# Patient Record
Sex: FEMALE | Race: OTHER | HISPANIC OR LATINO | URBAN - METROPOLITAN AREA
[De-identification: names, ages, dates, MRNs, and addresses within clinical notes are randomized per-mention and may not be internally consistent; named-entity substitution may affect disease eponyms.]

---

## 2023-12-09 ENCOUNTER — EMERGENCY (EMERGENCY)
Facility: HOSPITAL | Age: 83
LOS: 1 days | Discharge: HOME | End: 2023-12-09
Attending: EMERGENCY MEDICINE
Payer: MEDICAID

## 2023-12-09 VITALS
OXYGEN SATURATION: 98 % | TEMPERATURE: 98 F | SYSTOLIC BLOOD PRESSURE: 122 MMHG | HEART RATE: 63 BPM | DIASTOLIC BLOOD PRESSURE: 76 MMHG | RESPIRATION RATE: 17 BRPM

## 2023-12-09 VITALS
TEMPERATURE: 98 F | HEIGHT: 62 IN | DIASTOLIC BLOOD PRESSURE: 80 MMHG | RESPIRATION RATE: 18 BRPM | SYSTOLIC BLOOD PRESSURE: 138 MMHG | OXYGEN SATURATION: 98 % | HEART RATE: 74 BPM

## 2023-12-09 PROCEDURE — 73521 X-RAY EXAM HIPS BI 2 VIEWS: CPT | Mod: 26

## 2023-12-09 PROCEDURE — 73521 X-RAY EXAM HIPS BI 2 VIEWS: CPT

## 2023-12-09 PROCEDURE — 70486 CT MAXILLOFACIAL W/O DYE: CPT | Mod: MA

## 2023-12-09 PROCEDURE — 70486 CT MAXILLOFACIAL W/O DYE: CPT | Mod: 26,MA

## 2023-12-09 PROCEDURE — 73562 X-RAY EXAM OF KNEE 3: CPT | Mod: 26,LT,RT

## 2023-12-09 PROCEDURE — 70450 CT HEAD/BRAIN W/O DYE: CPT | Mod: MA

## 2023-12-09 PROCEDURE — 99284 EMERGENCY DEPT VISIT MOD MDM: CPT | Mod: 25

## 2023-12-09 PROCEDURE — 99285 EMERGENCY DEPT VISIT HI MDM: CPT

## 2023-12-09 PROCEDURE — 73562 X-RAY EXAM OF KNEE 3: CPT

## 2023-12-09 PROCEDURE — 70450 CT HEAD/BRAIN W/O DYE: CPT | Mod: 26,MA

## 2023-12-09 RX ORDER — ACETAMINOPHEN 500 MG
650 TABLET ORAL ONCE
Refills: 0 | Status: COMPLETED | OUTPATIENT
Start: 2023-12-09 | End: 2023-12-09

## 2023-12-09 RX ORDER — IBUPROFEN 200 MG
400 TABLET ORAL ONCE
Refills: 0 | Status: DISCONTINUED | OUTPATIENT
Start: 2023-12-09 | End: 2023-12-12

## 2023-12-09 RX ADMIN — Medication 650 MILLIGRAM(S): at 13:30

## 2023-12-09 NOTE — ED PROVIDER NOTE - NSFOLLOWUPCLINICS_GEN_ALL_ED_FT
[facility]  Internal Medicine  [address] [place] [address]  Phone: [phone]  Fax: [phone]  Follow Up Time: 1-3 Days     [facility]  Internal Medicine  [address] [place] [address]  Phone: [phone]  Fax: [phone]  Follow Up Time: 1-3 Days

## 2023-12-09 NOTE — ED PROVIDER NOTE - PATIENT PORTAL LINK FT
You can access the FollowMyHealth Patient Portal offered by [facility] by registering at the following website: [email or web address]. By joining [employer]’s FollowMyHealth portal, you will also be able to view your health information using other applications (apps) compatible with our system. You can access the FollowMyHealth Patient Portal offered by [facility] by registering at the following website: [email or web address]. By joining [employer]’s FollowMyHealth portal, you will also be able to view your health information using other applications (apps) compatible with our system.

## 2023-12-09 NOTE — ED PROVIDER NOTE - OBJECTIVE STATEMENT
83-year-old woman, history of hypothyroidism, hypertension, hyperlipidemia, brought in by her son after she had a witnessed fall yesterday, witnessed by her sister, fell from a standing position while walking on a flat surface, with injury to her head and face, bilateral knees, and bilateral hips.  She denies any loss of consciousness.  She is not on any anticoagulation.  She did not have any symptoms preceding the fall.  She has been able to walk since then.

## 2023-12-09 NOTE — ED PROVIDER NOTE - NSFOLLOWUPINSTRUCTIONS_ED_ALL_ED_FT
Please follow up with your PMD or Medicine Clinic in 2-3 days.  Return to the ER for worsening or concerning symptoms.  Apply ice to swollen joints or areas, rest extremity and keep elevated.  Take Ibuprofen (Advil or Motrin) 400mg every 6 hours as needed for pain or fever with food.  Take Acetaminophen (Tylenol) 650mg every 6 hours as needed for pain or fever.    - - - - - - - - - - - - -  Head Injury, Adult    There are many types of head injuries. They can be as minor as a small bump. Some head injuries can be worse. Worse injuries include:  A strong hit to the head that shakes the brain back and forth, causing damage (concussion).  A bruise (contusion) of the brain. This means there is bleeding in the brain that can cause swelling.  A cracked skull (skull fracture).  Bleeding in the brain that gathers, gets thick (makes a clot), and forms a bump (hematoma).  Most problems from a head injury come in the first 24 hours. However, you may still have side effects up to 7–10 days after your injury. It is important to watch your condition for any changes. You may need to be watched in the emergency department or urgent care, or you may need to stay in the hospital.    What are the causes?  There are many possible causes of a head injury. A serious head injury may be caused by:  A car accident.  Bicycle or motorcycle accidents.  Sports injuries.  Falls.  Being hit by an object.  What are the signs or symptoms?  Symptoms of a head injury include a bruise, bump, or bleeding where the injury happened. Other physical symptoms may include:  Headache.  Feeling like you may vomit (nauseous) or vomiting.  Dizziness.  Blurred or double vision.  Being uncomfortable around bright lights or loud noises.  Shaking movements that you cannot control (seizures).  Feeling tired.  Trouble being woken up.  Fainting or loss of consciousness.  Mental or emotional symptoms may include:  Feeling grumpy or cranky.  Confusion and memory problems.  Having trouble paying attention or concentrating.  Changes in eating or sleeping habits.  Feeling worried or nervous (anxious).  Feeling sad (depressed).  How is this treated?  Treatment for this condition depends on how severe the injury is and the type of injury you have. The main goal is to prevent problems and to allow the brain time to heal.    Mild head injury    If you have a mild head injury, you may be sent home, and treatment may include:  Being watched. A responsible adult should stay with you for 24 hours after your injury and check on you often.  Physical rest.  Brain rest.  Pain medicines.  Severe head injury    If you have a severe head injury, treatment may include:  Being watched closely. This includes staying in the hospital.  Medicines to:  Help with pain.  Prevent seizures.  Help with brain swelling.  Protecting your airway and using a machine that helps you breathe (ventilator).  Treatments to watch for and manage swelling inside the brain.  Brain surgery. This may be needed to:  Remove a collection of blood or blood clots.  Stop the bleeding.  Remove a part of the skull. This allows room for the brain to swell.  Follow these instructions at home:  Activity    Rest.  Avoid activities that are hard or tiring.  Make sure you get enough sleep.  Let your brain rest. Do this by limiting activities that need a lot of thought or attention, such as:  Watching TV.  Playing memory games and puzzles.  Job-related work or homework.  Working on the computer, social media, and texting.  Avoid activities that could cause another head injury until your doctor says it is okay. This includes playing sports. Having another head injury, especially before the first one has healed, can be dangerous.  Ask your doctor when it is safe for you to go back to your normal activities, such as work or school. Ask your doctor for a step-by-step plan for slowly going back to your normal activities.  Ask your doctor when you can drive, ride a bicycle, or use heavy machinery. Do not do these activities if you are dizzy.  Lifestyle      Do not drink alcohol until your doctor says it is okay.  Do not use drugs.  If it is harder than usual to remember things, write them down.  If you are easily distracted, try to do one thing at a time.  Talk with family members or close friends when making important decisions.  Tell your friends, family, a trusted co-worker, and  about your injury, symptoms, and limits (restrictions). Have them watch for any problems that are new or getting worse.  General instructions    Take over-the-counter and prescription medicines only as told by your doctor.  Have someone stay with you for 24 hours after your head injury. This person should watch you for any changes in your symptoms and be ready to get help.  Keep all follow-up visits as told by your doctor. This is important.  How is this prevented?    Work on your balance and strength. This can help you avoid falls.  Wear a seat belt when you are in a moving vehicle.  Wear a helmet when you:  Ride a bicycle.  Ski.  Do any other sport or activity that has a risk of injury.  If you drink alcohol:  Limit how much you use to:  0–1 drink a day for nonpregnant women.  0–2 drinks a day for men.  Be aware of how much alcohol is in your drink. In the [place] one drink equals one 12 oz bottle of beer (355 mL), one 5 oz glass of wine (148 mL), or one 1½ oz glass of hard liquor (44 mL).  Make your home safer by:  Getting rid of clutter from the floors and stairs. This includes things that can make you trip.  Using grab bars in bathrooms and handrails by stairs.  Placing non-slip mats on floors and in bathtubs.  Putting more light in dim areas.  Where to find more information  Centers for Disease Control and Prevention: www.cdc.gov  Get help right away if:  You have:  A very bad headache that is not helped by medicine.  Trouble walking or weakness in your arms and legs.  Clear or bloody fluid coming from your nose or ears.  Changes in how you see (vision).  A seizure.  More confusion or more grumpy moods.  Your symptoms get worse.  You are sleepier than normal and have trouble staying awake.  You lose your balance.  The black centers of your eyes (pupils) change in size.  Your speech is slurred.  Your dizziness gets worse.  You vomit.  These symptoms may be an emergency. Do not wait to see if the symptoms will go away. Get medical help right away. Call your local emergency services (911 in the [place] Do not drive yourself to the hospital.    Summary  Head injuries can be as minor as a small bump. Some head injuries can be worse.  Treatment for this condition depends on how severe the injury is and the type of injury you have.  Have someone stay with you for 24 hours after your head injury.  Ask your doctor when it is safe for you to go back to your normal activities, such as work or school.  To prevent a head injury, wear a seat belt in a car, wear a helmet when you use a bicycle, limit your alcohol use, and make your home safer.  This information is not intended to replace advice given to you by your health care provider. Make sure you discuss any questions you have with your health care provider.  - - - - - - - - - - - - -  Lesión en la deidre en los adultos  Head Injury, Adult    Hay muchos tipos de lesiones en la deidre. Algunas pueden ser tan leves emanuel un chichón pequeño. Otras pueden ser más graves. Ejemplos de lesiones graves:  Un golpe ella en la deidre que sacude el cerebro hacia bakari y atrás (conmoción cerebral).  Un moretón (contusión) en el cerebro. Orwin significa que hay hemorragia en el cerebro que puede causar hinchazón.  Fisura en el cráneo (fractura de cráneo).  Hemorragia en el cerebro que se acumula, se espesa (se produce un coágulo) y forma un bulto (hematoma).  La mayoría de los problemas provocados por denisa lesión en la deidre ocurren nicole las primeras 24 horas. Sin embargo, es posible que siga teniendo efectos secundarios entre 7 y 10 días después de la lesión. Es importante controlar cohen afección para leonor si hay cambios. Es posible que deban observarlo en el departamento de emergencias o en el servicio de atención urgente, o puede ser necesario que se quede en el hospital.    ¿Cuáles son las causas?  Hay muchas causas posibles de denisa lesión en la deidre. Denisa lesión en la deidre puede tener estas causas:  Un accidente automovilístico.  Accidentes en bicicleta o motocicleta.  Lesiones deportivas.  Caídas.  Ser golpeado por un objeto.  ¿Cuáles son los signos o síntomas?  Los síntomas de lesión en la deidre incluyen un moretón, un chichón o un sangrado en el lugar de la lesión. Otros síntomas físicos pueden ser:  Dolor de deidre.  Sensación de que va a vomitar (náuseas) o vomitar.  Mareos.  Visión borrosa o doble.  Sentir incomodidad cerca de luces brillantes o ruidos rajesh.  Temblores que no puede controlar (convulsiones).  Cansancio.  Dificultad para despertarse.  Desmayos o pérdida de la conciencia.  Los síntomas mentales o emocionales pueden incluir los siguientes:  Sentirse abrumado o malhumorado.  Confusión y problemas de memoria.  Tener problemas para prestar atención o concentrarse.  Cambios en los hábitos de alimentación o en el sueño.  Sentirse preocupado o nervioso (ansioso).  Sentirse courtney (deprimido).  ¿Cómo se trata?  El tratamiento de esta afección depende de la gravedad de la lesión y del tipo de lesión que sufrió. El objetivo principal es prevenir problemas y darle tiempo al cerebro para que se recupere.    Lesión de deidre leve    Si usted sufre denisa lesión de deidre leve, es posible que lo envíen a casa, y el tratamiento puede incluir lo siguiente:  Estar en observación. Un adulto responsable debe quedarse con usted nicole 24 horas después de producida la lesión y controlarlo con frecuencia.  Boaz físico.  Boaz cerebral.  Analgésicos.  Lesión de deidre grave    Si tiene denisa lesión de deidre grave, el tratamiento puede incluir lo siguiente:  Estar en observación cercana. Orwin incluye permanecer en el hospital.  Medicamentos para:  Ayudar a aliviar el dolor.  Evitar las convulsiones.  Ayudar con la hinchazón del cerebro.  Proteger las vías respiratorias y usar denisa máquina que lo ayude a respirar (respirador).  Tratamientos para observar y tratar la hinchazón dentro del cerebro.  Cirugía de cerebro. Esta puede ser necesaria en los siguientes casos:  Extraer denisa acumulación de rafael o coágulos de rafael.  Interrumpir el sangrado.  Retirar denisa parte del cráneo. Orwin permite que el cerebro tenga lugar para hincharse.  Siga estas instrucciones en cohen casa:  Actividad    Kristie reposo.  Evite las actividades difíciles o cansadoras.  Asegúrese de dormir lo suficiente.  Deje que el cerebro descanse. Para hacerlo, limite las actividades que requieran pensar mucho o prestar mucha atención, emanuel las siguientes:  Mirar televisión.  Jugar juegos de memoria y armar rompecabezas.  Tareas para el hogar o trabajos relacionados con el empleo.  Trabajar en la computadora, participar en redes sociales y enviar mensajes de texto.  Evite las actividades que pudieran provocar otra lesión en la deidre hasta que el médico lo autorice. Entre ellas, practicar deportes. Puede ser peligroso tener otra lesión en la deidre antes de que se haya recuperado de la primera.  Pregunte al médico cuándo puede regresar a las actividades normales sin riesgo, emanuel al trabajo o a la escuela. Pida al médico un plan detallado para volver a realizar las actividades habituales de manera progresiva.  Consulte a cohen médico cuándo puede conducir, andar en bicicleta o usar maquinaria pesada. No realice estas actividades si se siente mareado.  Estilo de jose david      No mone alcohol hasta que el médico lo autorice.  No consuma drogas.  Si le resulta más difícil que lo habitual recordar las cosas, escríbalas.  Trate de hacer denisa cosa por vez si se distrae con facilidad.  Consulte con familiares y amigos si debe saul decisiones importantes.  Cuénteles a hyun amigos, familiares, colegas de confianza y cohen gerente en el trabajo sobre cohen lesión, los síntomas y hyun límites (restricciones). Pídales que observen si aparecen nuevos problemas o empeoran los existentes.  Instrucciones generales    [employer] los medicamentos de venta ysabel y los recetados solamente emanuel se lo haya indicado el médico.  Pídale a alguien que lo acompañe nicole 24 horas después de la lesión en la deidre. Esta persona debe observar si hay cambios en los síntomas y estar preparada para obtener ayuda.  Concurra a todas las visitas de seguimiento emanuel se lo haya indicado el médico. Orwin es importante.  ¿Cómo se rochelle?    Trabaje en cohen equilibrio y cohen fuerza. Orwin puede ayudarlo a evitar caídas.  Use el cinturón de seguridad cuando se encuentre en un vehículo en movimiento.  Use un zurdo cuando realice las siguientes actividades:  Andar en bicicleta.  Esquiar.  Practicar algún otro deporte o actividad que representen un riesgo de lesión.  Si christina alcohol:  Limite la cantidad que christina:  De 0 a 1 medida por día para las mujeres que no estén embarazadas.  De 0 a 2 medidas por día para los hombres.  Esté atento a la cantidad de alcohol que hay en las bebidas que arin. En los [place], denisa medida equivale a denisa botella de cerveza de 12 oz (355 ml), un vaso de vino de 5 oz (148 ml) o un vaso de denisa bebida alcohólica de shantanu graduación de 1½ oz (44 ml).  Kristie de cohen hogar un lugar más seguro:  Deshacerse de los obstáculos en pisos y escaleras. Orwin incluye las cosas que pueden hacer que se tropiece.  Coloque barras para sostén en los vianey y pasamanos en las escaleras.  Ponga alfombras antideslizantes en pisos y bañeras.  Mejore la iluminación de las zonas oscuras.  Dónde buscar más información  Centers for Disease Control and Prevention (Centros para el Control y la Prevención de Enfermedades): www.cdc.gov  Solicite ayuda de inmediato si:  Tiene lo siguiente:  Dolor de deidre muy ella que no se calma con medicamentos.  Dificultad para caminar o debilidad en los brazos o las piernas.  Secreción transparente o con rafael que proviene de la nariz o de los oídos.  Cambios en la forma de leonor (visión).  Denisa convulsión.  Más confusión o está más gruñón.  Hyun síntomas empeoran.  Está más somnoliento de lo normal o tiene dificultad para mantenerse despierto.  Pierde el equilibrio.  La parte central chester de los ojos (pupila) cambia de tamaño.  Arrastra las palabras.  Cohen mareo empeora.  Vomita.  Estos síntomas pueden indicar denisa emergencia. No espere a leonor si los síntomas desaparecen. Solicite atención médica de inmediato. Comuníquese con el servicio de emergencias de cohen localidad (911 en los [place]). No conduzca por hyun propios medios hasta el hospital.    Resumen  Las lesiones en la deidre pueden ser tan leves emanuel un pequeño chichón. Otras pueden ser más graves.  El tratamiento de esta afección depende de la gravedad de la lesión y del tipo de lesión que sufrió.  Pídale a alguien que lo acompañe nicole 24 horas después de la lesión en la deidre.  Pregunte al médico cuándo puede regresar a las actividades normales sin riesgo, emanuel al trabajo o a la escuela.  Para evitar denisa lesión en la deidre, use cinturón de seguridad cuando se desplace en automóvil, use zurdo cuando monte en bicicleta, limite el consumo de alcohol y kristie que cohen hogar sea más seguro.  Esta información no tiene emanuel fin reemplazar el consejo del médico. Asegúrese de hacerle al médico cualquier pregunta que tenga.  - - - - - - - - - - - - -  Contusion  A contusion is a deep bruise. This is a result of an injury that causes bleeding under the skin. Symptoms of bruising include pain, swelling, and discolored skin. The skin may turn blue, purple, or yellow.    Follow these instructions at home:  Managing pain, stiffness, and swelling    Bag of ice on a towel on the skin.  You may use RICE. This stands for:  Resting.  Icing.  Compression, or putting pressure on the injured area.  Elevating, or raising the injured area.  To follow this method, do these actions:  Rest the injured area.  If told, put ice on the injured area. To do this:  Put ice in a plastic bag.  Place a towel between your skin and the bag.  Leave the ice on for 20 minutes, 2–3 times per day.  If your skin turns bright red, take off the ice right away to prevent skin damage. The risk of skin damage is higher if you cannot feel pain, heat, or cold.  If told, apply compression on the injured area using an elastic bandage. Make sure the bandage is not too tight. If the area tingles or has a loss of feeling (numbness), remove it and put it back on as told by your doctor.  If possible, elevate the injured area above the level of your heart while you are sitting or lying down.  General instructions    Take over-the-counter and prescription medicines only as told by your doctor.  Keep all follow-up visits. Your doctor may want to see how your contusion is healing with treatment.  Contact a doctor if:  Your symptoms do not get better after several days of treatment.  Your symptoms get worse.  You have trouble moving the injured area.  Get help right away if:  You have very bad pain.  You have a loss of feeling (numbness) in a hand or foot.  Your hand or foot turns pale or cold.  This information is not intended to replace advice given to you by your health care provider. Make sure you discuss any questions you have with your health care provider.  - - - - - - - - - - - - -  Contusión  Contusion  Denisa contusión es un hematoma profundo. Es el resultado de denisa lesión que causa sangrado debajo de la piel. Los síntomas de hematoma incluyen dolor, hinchazón y cambio de color en la piel. La piel puede ponerse magno, morada o amarilla.    Siga estas indicaciones en cohen casa:  Control del dolor, la rigidez y la hinchazón    Bag of ice on a towel on the skin.  Puede usar RHCE. Orwin significa:  Reposo.  Hielo.  Compresión, o ejercer presión sobre la atilio lesionada.  Elevar, o poner en alto la atilio lesionada.  Para seguir rush método, kristie lo siguiente:  Mantenga la atilio de la lesión en reposo.  Aplique hielo sobre la atilio lesionada si se lo indican. Para hacer esto:  Ponga el hielo en denisa bolsa plástica.  Coloque denisa toalla entre la piel y la bolsa.  Aplique el hielo nicole 20 minutos, 2 o 3 veces por día.  Si la piel se le pone de color almazan brillante, retire el hielo de inmediato para evitar daños en la piel. El riesgo de daño en la piel es mayor si no puede sentir dolor, calor o frío.  Si se lo indican, ejerza compresión en la atilio de la lesión con denisa venda elástica. Asegúrese de que la venda no esté muy ajustada. Si siente hormigueo o ha perdido la sensibilidad (adormecimiento) en la atilio, quítesela y vuelva a colocarla emanuel se lo haya indicado el médico.  Si es posible, cuando esté sentado o acostado, eleve la atilio lesionada por encima del nivel del corazón.  Indicaciones generales    Use los medicamentos de venta ysabel y los recetados solamente emanuel se lo haya indicado el médico.  Concurra a todas las visitas de seguimiento. Es posible que cohen médico desee leonor cómo está recuperándose la contusión con el tratamiento.  Comuníquese con un médico si:  Los síntomas no mejoran después de varios días de tratamiento.  Hyun síntomas empeoran.  Tiene dificultad para  la atilio de la lesión.  Solicite ayuda de inmediato si:  Siente mucho dolor.  Pierde la sensibilidad (adormecimiento) en denisa mano o un pie.  La mano o el pie están pálidos o fríos.  Esta información no tiene emanuel fin reemplazar el consejo del médico. Asegúrese de hacerle al médico cualquier pregunta que tenga. Please follow up with your PMD or Medicine Clinic in 2-3 days.  Return to the ER for worsening or concerning symptoms.  Apply ice to swollen joints or areas, rest extremity and keep elevated.  Take Ibuprofen (Advil or Motrin) 400mg every 6 hours as needed for pain or fever with food.  Take Acetaminophen (Tylenol) 650mg every 6 hours as needed for pain or fever.    - - - - - - - - - - - - -  Head Injury, Adult    There are many types of head injuries. They can be as minor as a small bump. Some head injuries can be worse. Worse injuries include:  A strong hit to the head that shakes the brain back and forth, causing damage (concussion).  A bruise (contusion) of the brain. This means there is bleeding in the brain that can cause swelling.  A cracked skull (skull fracture).  Bleeding in the brain that gathers, gets thick (makes a clot), and forms a bump (hematoma).  Most problems from a head injury come in the first 24 hours. However, you may still have side effects up to 7–10 days after your injury. It is important to watch your condition for any changes. You may need to be watched in the emergency department or urgent care, or you may need to stay in the hospital.    What are the causes?  There are many possible causes of a head injury. A serious head injury may be caused by:  A car accident.  Bicycle or motorcycle accidents.  Sports injuries.  Falls.  Being hit by an object.  What are the signs or symptoms?  Symptoms of a head injury include a bruise, bump, or bleeding where the injury happened. Other physical symptoms may include:  Headache.  Feeling like you may vomit (nauseous) or vomiting.  Dizziness.  Blurred or double vision.  Being uncomfortable around bright lights or loud noises.  Shaking movements that you cannot control (seizures).  Feeling tired.  Trouble being woken up.  Fainting or loss of consciousness.  Mental or emotional symptoms may include:  Feeling grumpy or cranky.  Confusion and memory problems.  Having trouble paying attention or concentrating.  Changes in eating or sleeping habits.  Feeling worried or nervous (anxious).  Feeling sad (depressed).  How is this treated?  Treatment for this condition depends on how severe the injury is and the type of injury you have. The main goal is to prevent problems and to allow the brain time to heal.    Mild head injury    If you have a mild head injury, you may be sent home, and treatment may include:  Being watched. A responsible adult should stay with you for 24 hours after your injury and check on you often.  Physical rest.  Brain rest.  Pain medicines.  Severe head injury    If you have a severe head injury, treatment may include:  Being watched closely. This includes staying in the hospital.  Medicines to:  Help with pain.  Prevent seizures.  Help with brain swelling.  Protecting your airway and using a machine that helps you breathe (ventilator).  Treatments to watch for and manage swelling inside the brain.  Brain surgery. This may be needed to:  Remove a collection of blood or blood clots.  Stop the bleeding.  Remove a part of the skull. This allows room for the brain to swell.  Follow these instructions at home:  Activity    Rest.  Avoid activities that are hard or tiring.  Make sure you get enough sleep.  Let your brain rest. Do this by limiting activities that need a lot of thought or attention, such as:  Watching TV.  Playing memory games and puzzles.  Job-related work or homework.  Working on the computer, social media, and texting.  Avoid activities that could cause another head injury until your doctor says it is okay. This includes playing sports. Having another head injury, especially before the first one has healed, can be dangerous.  Ask your doctor when it is safe for you to go back to your normal activities, such as work or school. Ask your doctor for a step-by-step plan for slowly going back to your normal activities.  Ask your doctor when you can drive, ride a bicycle, or use heavy machinery. Do not do these activities if you are dizzy.  Lifestyle      Do not drink alcohol until your doctor says it is okay.  Do not use drugs.  If it is harder than usual to remember things, write them down.  If you are easily distracted, try to do one thing at a time.  Talk with family members or close friends when making important decisions.  Tell your friends, family, a trusted co-worker, and  about your injury, symptoms, and limits (restrictions). Have them watch for any problems that are new or getting worse.  General instructions    Take over-the-counter and prescription medicines only as told by your doctor.  Have someone stay with you for 24 hours after your head injury. This person should watch you for any changes in your symptoms and be ready to get help.  Keep all follow-up visits as told by your doctor. This is important.  How is this prevented?    Work on your balance and strength. This can help you avoid falls.  Wear a seat belt when you are in a moving vehicle.  Wear a helmet when you:  Ride a bicycle.  Ski.  Do any other sport or activity that has a risk of injury.  If you drink alcohol:  Limit how much you use to:  0–1 drink a day for nonpregnant women.  0–2 drinks a day for men.  Be aware of how much alcohol is in your drink. In the [place] one drink equals one 12 oz bottle of beer (355 mL), one 5 oz glass of wine (148 mL), or one 1½ oz glass of hard liquor (44 mL).  Make your home safer by:  Getting rid of clutter from the floors and stairs. This includes things that can make you trip.  Using grab bars in bathrooms and handrails by stairs.  Placing non-slip mats on floors and in bathtubs.  Putting more light in dim areas.  Where to find more information  Centers for Disease Control and Prevention: www.cdc.gov  Get help right away if:  You have:  A very bad headache that is not helped by medicine.  Trouble walking or weakness in your arms and legs.  Clear or bloody fluid coming from your nose or ears.  Changes in how you see (vision).  A seizure.  More confusion or more grumpy moods.  Your symptoms get worse.  You are sleepier than normal and have trouble staying awake.  You lose your balance.  The black centers of your eyes (pupils) change in size.  Your speech is slurred.  Your dizziness gets worse.  You vomit.  These symptoms may be an emergency. Do not wait to see if the symptoms will go away. Get medical help right away. Call your local emergency services (911 in the [place] Do not drive yourself to the hospital.    Summary  Head injuries can be as minor as a small bump. Some head injuries can be worse.  Treatment for this condition depends on how severe the injury is and the type of injury you have.  Have someone stay with you for 24 hours after your head injury.  Ask your doctor when it is safe for you to go back to your normal activities, such as work or school.  To prevent a head injury, wear a seat belt in a car, wear a helmet when you use a bicycle, limit your alcohol use, and make your home safer.  This information is not intended to replace advice given to you by your health care provider. Make sure you discuss any questions you have with your health care provider.  - - - - - - - - - - - - -  Lesión en la deidre en los adultos  Head Injury, Adult    Hay muchos tipos de lesiones en la deidre. Algunas pueden ser tan leves emanuel un chichón pequeño. Otras pueden ser más graves. Ejemplos de lesiones graves:  Un golpe ella en la deidre que sacude el cerebro hacia bakari y atrás (conmoción cerebral).  Un moretón (contusión) en el cerebro. Rinard significa que hay hemorragia en el cerebro que puede causar hinchazón.  Fisura en el cráneo (fractura de cráneo).  Hemorragia en el cerebro que se acumula, se espesa (se produce un coágulo) y forma un bulto (hematoma).  La mayoría de los problemas provocados por denisa lesión en la deidre ocurren nicole las primeras 24 horas. Sin embargo, es posible que siga teniendo efectos secundarios entre 7 y 10 días después de la lesión. Es importante controlar cohen afección para leonor si hay cambios. Es posible que deban observarlo en el departamento de emergencias o en el servicio de atención urgente, o puede ser necesario que se quede en el hospital.    ¿Cuáles son las causas?  Hay muchas causas posibles de denisa lesión en la deidre. Denisa lesión en la deidre puede tener estas causas:  Un accidente automovilístico.  Accidentes en bicicleta o motocicleta.  Lesiones deportivas.  Caídas.  Ser golpeado por un objeto.  ¿Cuáles son los signos o síntomas?  Los síntomas de lesión en la deidre incluyen un moretón, un chichón o un sangrado en el lugar de la lesión. Otros síntomas físicos pueden ser:  Dolor de deidre.  Sensación de que va a vomitar (náuseas) o vomitar.  Mareos.  Visión borrosa o doble.  Sentir incomodidad cerca de luces brillantes o ruidos rajesh.  Temblores que no puede controlar (convulsiones).  Cansancio.  Dificultad para despertarse.  Desmayos o pérdida de la conciencia.  Los síntomas mentales o emocionales pueden incluir los siguientes:  Sentirse abrumado o malhumorado.  Confusión y problemas de memoria.  Tener problemas para prestar atención o concentrarse.  Cambios en los hábitos de alimentación o en el sueño.  Sentirse preocupado o nervioso (ansioso).  Sentirse courtney (deprimido).  ¿Cómo se trata?  El tratamiento de esta afección depende de la gravedad de la lesión y del tipo de lesión que sufrió. El objetivo principal es prevenir problemas y darle tiempo al cerebro para que se recupere.    Lesión de deidre leve    Si usted sufre denisa lesión de deidre leve, es posible que lo envíen a casa, y el tratamiento puede incluir lo siguiente:  Estar en observación. Un adulto responsable debe quedarse con usted nicole 24 horas después de producida la lesión y controlarlo con frecuencia.  [place] físico.  [place] cerebral.  Analgésicos.  Lesión de deidre grave    Si tiene denisa lesión de deidre grave, el tratamiento puede incluir lo siguiente:  Estar en observación cercana. Rinard incluye permanecer en el hospital.  Medicamentos para:  Ayudar a aliviar el dolor.  Evitar las convulsiones.  Ayudar con la hinchazón del cerebro.  Proteger las vías respiratorias y usar denisa máquina que lo ayude a respirar (respirador).  Tratamientos para observar y tratar la hinchazón dentro del cerebro.  Cirugía de cerebro. Esta puede ser necesaria en los siguientes casos:  Extraer denisa acumulación de rafael o coágulos de rafael.  Interrumpir el sangrado.  Retirar denisa parte del cráneo. Rinard permite que el cerebro tenga lugar para hincharse.  Siga estas instrucciones en cohen casa:  Actividad    Kristie reposo.  Evite las actividades difíciles o cansadoras.  Asegúrese de dormir lo suficiente.  Deje que el cerebro descanse. Para hacerlo, limite las actividades que requieran pensar mucho o prestar mucha atención, emanuel las siguientes:  Mirar televisión.  Jugar juegos de memoria y armar rompecabezas.  Tareas para el hogar o trabajos relacionados con el empleo.  Trabajar en la computadora, participar en redes sociales y enviar mensajes de texto.  Evite las actividades que pudieran provocar otra lesión en la deidre hasta que el médico lo autorice. Entre ellas, practicar deportes. Puede ser peligroso tener otra lesión en la deidre antes de que se haya recuperado de la primera.  Pregunte al médico cuándo puede regresar a las actividades normales sin riesgo, emanuel al trabajo o a la escuela. Pida al médico un plan detallado para volver a realizar las actividades habituales de manera progresiva.  Consulte a cohen médico cuándo puede conducir, andar en bicicleta o usar maquinaria pesada. No realice estas actividades si se siente mareado.  Estilo de jose david      No mone alcohol hasta que el médico lo autorice.  No consuma drogas.  Si le resulta más difícil que lo habitual recordar las cosas, escríbalas.  Trate de hacer denisa cosa por vez si se distrae con facilidad.  Consulte con familiares y amigos si debe saul decisiones importantes.  Cuénteles a hyun amigos, familiares, colegas de confianza y cohen gerente en el trabajo sobre cohen lesión, los síntomas y hyun límites (restricciones). Pídales que observen si aparecen nuevos problemas o empeoran los existentes.  Instrucciones generales    [place] los medicamentos de venta ysabel y los recetados solamente emanuel se lo haya indicado el médico.  Pídale a alguien que lo acompañe nicole 24 horas después de la lesión en la deidre. Esta persona debe observar si hay cambios en los síntomas y estar preparada para obtener ayuda.  Concurra a todas las visitas de seguimiento emanuel se lo haya indicado el médico. Rinard es importante.  ¿Cómo se rochelle?    Trabaje en cohen equilibrio y cohen fuerza. Rinard puede ayudarlo a evitar caídas.  Use el cinturón de seguridad cuando se encuentre en un vehículo en movimiento.  Use un zurdo cuando realice las siguientes actividades:  Andar en bicicleta.  Esquiar.  Practicar algún otro deporte o actividad que representen un riesgo de lesión.  Si christina alcohol:  Limite la cantidad que christina:  De 0 a 1 medida por día para las mujeres que no estén embarazadas.  De 0 a 2 medidas por día para los hombres.  Esté atento a la cantidad de alcohol que hay en las bebidas que arin. En los [place], denisa medida equivale a denisa botella de cerveza de 12 oz (355 ml), un vaso de vino de 5 oz (148 ml) o un vaso de denisa bebida alcohólica de shantanu graduación de 1½ oz (44 ml).  Kristie de cohen hogar un lugar más seguro:  Deshacerse de los obstáculos en pisos y escaleras. Rinard incluye las cosas que pueden hacer que se tropiece.  Coloque barras para sostén en los vianey y pasamanos en las escaleras.  Ponga alfombras antideslizantes en pisos y bañeras.  Mejore la iluminación de las zonas oscuras.  Dónde buscar más información  Centers for Disease Control and Prevention (Centros para el Control y la Prevención de Enfermedades): www.cdc.gov  Solicite ayuda de inmediato si:  Tiene lo siguiente:  Dolor de deidre muy ella que no se calma con medicamentos.  Dificultad para caminar o debilidad en los brazos o las piernas.  Secreción transparente o con rafael que proviene de la nariz o de los oídos.  Cambios en la forma de leonor (visión).  Denisa convulsión.  Más confusión o está más gruñón.  Hyun síntomas empeoran.  Está más somnoliento de lo normal o tiene dificultad para mantenerse despierto.  Pierde el equilibrio.  La parte central chester de los ojos (pupila) cambia de tamaño.  Arrastra las palabras.  Cohen mareo empeora.  Vomita.  Estos síntomas pueden indicar denisa emergencia. No espere a leonor si los síntomas desaparecen. Solicite atención médica de inmediato. Comuníquese con el servicio de emergencias de cohen localidad (911 en los [place]). No conduzca por hyun propios medios hasta el hospital.    Resumen  Las lesiones en la deidre pueden ser tan leves emanuel un pequeño chichón. Otras pueden ser más graves.  El tratamiento de esta afección depende de la gravedad de la lesión y del tipo de lesión que sufrió.  Pídale a alguien que lo acompañe nicole 24 horas después de la lesión en la deidre.  Pregunte al médico cuándo puede regresar a las actividades normales sin riesgo, emanuel al trabajo o a la escuela.  Para evitar denisa lesión en la deidre, use cinturón de seguridad cuando se desplace en automóvil, use zurdo cuando monte en bicicleta, limite el consumo de alcohol y kristie que cohen hogar sea más seguro.  Esta información no tiene emanuel fin reemplazar el consejo del médico. Asegúrese de hacerle al médico cualquier pregunta que tenga.  - - - - - - - - - - - - -  Contusion  A contusion is a deep bruise. This is a result of an injury that causes bleeding under the skin. Symptoms of bruising include pain, swelling, and discolored skin. The skin may turn blue, purple, or yellow.    Follow these instructions at home:  Managing pain, stiffness, and swelling    Bag of ice on a towel on the skin.  You may use RICE. This stands for:  Resting.  Icing.  Compression, or putting pressure on the injured area.  Elevating, or raising the injured area.  To follow this method, do these actions:  Rest the injured area.  If told, put ice on the injured area. To do this:  Put ice in a plastic bag.  Place a towel between your skin and the bag.  Leave the ice on for 20 minutes, 2–3 times per day.  If your skin turns bright red, take off the ice right away to prevent skin damage. The risk of skin damage is higher if you cannot feel pain, heat, or cold.  If told, apply compression on the injured area using an elastic bandage. Make sure the bandage is not too tight. If the area tingles or has a loss of feeling (numbness), remove it and put it back on as told by your doctor.  If possible, elevate the injured area above the level of your heart while you are sitting or lying down.  General instructions    Take over-the-counter and prescription medicines only as told by your doctor.  Keep all follow-up visits. Your doctor may want to see how your contusion is healing with treatment.  Contact a doctor if:  Your symptoms do not get better after several days of treatment.  Your symptoms get worse.  You have trouble moving the injured area.  Get help right away if:  You have very bad pain.  You have a loss of feeling (numbness) in a hand or foot.  Your hand or foot turns pale or cold.  This information is not intended to replace advice given to you by your health care provider. Make sure you discuss any questions you have with your health care provider.  - - - - - - - - - - - - -  Contusión  Contusion  Denisa contusión es un hematoma profundo. Es el resultado de denisa lesión que causa sangrado debajo de la piel. Los síntomas de hematoma incluyen dolor, hinchazón y cambio de color en la piel. La piel puede ponerse magno, morada o amarilla.    Siga estas indicaciones en cohen casa:  Control del dolor, la rigidez y la hinchazón    Bag of ice on a towel on the skin.  Puede usar RHCE. Rinard significa:  Reposo.  Hielo.  Compresión, o ejercer presión sobre la atilio lesionada.  Elevar, o poner en alto la atilio lesionada.  Para seguir rush método, kristie lo siguiente:  Mantenga la atilio de la lesión en reposo.  Aplique hielo sobre la atilio lesionada si se lo indican. Para hacer esto:  Ponga el hielo en denisa bolsa plástica.  Coloque denisa toalla entre la piel y la bolsa.  Aplique el hielo nicole 20 minutos, 2 o 3 veces por día.  Si la piel se le pone de color almazan brillante, retire el hielo de inmediato para evitar daños en la piel. El riesgo de daño en la piel es mayor si no puede sentir dolor, calor o frío.  Si se lo indican, ejerza compresión en la atilio de la lesión con denisa venda elástica. Asegúrese de que la venda no esté muy ajustada. Si siente hormigueo o ha perdido la sensibilidad (adormecimiento) en la atilio, quítesela y vuelva a colocarla emanuel se lo haya indicado el médico.  Si es posible, cuando esté sentado o acostado, eleve la atilio lesionada por encima del nivel del corazón.  Indicaciones generales    Use los medicamentos de venta ysable y los recetados solamente emanuel se lo haya indicado el médico.  Concurra a todas las visitas de seguimiento. Es posible que cohen médico desee leonor cómo está recuperándose la contusión con el tratamiento.  Comuníquese con un médico si:  Los síntomas no mejoran después de varios días de tratamiento.  Hyun síntomas empeoran.  Tiene dificultad para  la atilio de la lesión.  Solicite ayuda de inmediato si:  Siente mucho dolor.  Pierde la sensibilidad (adormecimiento) en denisa mano o un pie.  La mano o el pie están pálidos o fríos.  Esta información no tiene emanuel fin reemplazar el consejo del médico. Asegúrese de hacerle al médico cualquier pregunta que tenga.

## 2023-12-09 NOTE — ED PROVIDER NOTE - CLINICAL SUMMARY MEDICAL DECISION MAKING FREE TEXT BOX
83-year-old woman, history of hypothyroidism, hypertension, hyperlipidemia, brought in by her son after she had a witnessed fall yesterday, witnessed by her sister, fell from a standing position while walking on a flat surface, with injury to her head and face, bilateral knees, and bilateral hips.  She denies any loss of consciousness--CT head/maxillofacial, X-rays hips/pelvis, b/l knees, reassess.

## 2023-12-09 NOTE — ED ADULT NURSE NOTE - OBJECTIVE STATEMENT
Pt presents to the ED s/p fall yesterday outside of a mall. Pt unclear is she loss consciousness. She did hit her face with a bruise to the left eye along with abrasions to the bilateral knees that are very painful. Denies blood thinners. A&Ox4. Stable. Safety maintained.

## 2023-12-09 NOTE — ED ADULT NURSE NOTE - ED STAT RN HANDOFF DETAILS
Pt discharged from ED in stable condition. discharge instructions, pain management, and follow up care discussed. Son and pt verbalized understanding.

## 2023-12-09 NOTE — ED ADULT TRIAGE NOTE - PRO INTERPRETER NEED 2
Citizen of [place] Nigerian Complex Repair And Modified Advancement Flap Text: The defect edges were debeveled with a #15 scalpel blade.  The primary defect was closed partially with a complex linear closure.  Given the location of the remaining defect, shape of the defect and the proximity to free margins a modified advancement flap was deemed most appropriate for complete closure of the defect.  Using a sterile surgical marker, an appropriate advancement flap was drawn incorporating the defect and placing the expected incisions within the relaxed skin tension lines where possible.    The area thus outlined was incised deep to adipose tissue with a #15 scalpel blade.  The skin margins were undermined to an appropriate distance in all directions utilizing iris scissors.

## 2023-12-09 NOTE — ED PROVIDER NOTE - PROGRESS NOTE DETAILS
Received from Dr Atiya nolasco imaging pending prior to likely DC  Results reviewed.  Pt reports feeling better. Stable gait  Pt/family advised regarding symptomatic/supportive care, importance of PMD follow up, and symptoms to prompt ED return.

## 2023-12-09 NOTE — ED ADULT NURSE NOTE - NSFALLHARMRISKINTERV_ED_ALL_ED
Communicate risk of Fall with Harm to all staff, patient, and family/Provide visual cue: red socks, yellow wristband, yellow gown, etc/Reinforce activity limits and safety measures with patient and family/Bed in lowest position, wheels locked, appropriate side rails in place/Call bell, personal items and telephone in reach/Instruct patient to call for assistance before getting out of bed/chair/stretcher/Non-slip footwear applied when patient is off stretcher/[place] to call system/Physically safe environment - no spills, clutter or unnecessary equipment/Purposeful Proactive Rounding/Room/bathroom lighting operational, light cord in reach Communicate risk of Fall with Harm to all staff, patient, and family/Provide visual cue: red socks, yellow wristband, yellow gown, etc/Reinforce activity limits and safety measures with patient and family/Bed in lowest position, wheels locked, appropriate side rails in place/Call bell, personal items and telephone in reach/Instruct patient to call for assistance before getting out of bed/chair/stretcher/Non-slip footwear applied when patient is off stretcher/[place] to call system/Physically safe environment - no spills, clutter or unnecessary equipment/Purposeful Proactive Rounding/Room/bathroom lighting operational, light cord in reach

## 2023-12-09 NOTE — ED PROVIDER NOTE - MUSCULOSKELETAL, MLM
Spine nontender, left knee range of motion is limited in terms of flexion due to pain, otherwise LE ROM normal; tenderness to b/l anterior knees and to hip areas b/l, no muscle or joint tenderness; able to bear weight and walk with minimal assistance